# Patient Record
Sex: MALE | Race: BLACK OR AFRICAN AMERICAN | NOT HISPANIC OR LATINO | URBAN - METROPOLITAN AREA
[De-identification: names, ages, dates, MRNs, and addresses within clinical notes are randomized per-mention and may not be internally consistent; named-entity substitution may affect disease eponyms.]

---

## 2018-08-02 ENCOUNTER — EMERGENCY (EMERGENCY)
Facility: HOSPITAL | Age: 23
LOS: 1 days | Discharge: HOME | End: 2018-08-02
Attending: EMERGENCY MEDICINE
Payer: SELF-PAY

## 2018-08-02 VITALS
HEART RATE: 92 BPM | WEIGHT: 309.97 LBS | TEMPERATURE: 98 F | HEIGHT: 67 IN | OXYGEN SATURATION: 97 % | RESPIRATION RATE: 16 BRPM | SYSTOLIC BLOOD PRESSURE: 119 MMHG | DIASTOLIC BLOOD PRESSURE: 62 MMHG

## 2018-08-02 DIAGNOSIS — Z98.890 OTHER SPECIFIED POSTPROCEDURAL STATES: Chronic | ICD-10-CM

## 2018-08-02 PROCEDURE — 99284 EMERGENCY DEPT VISIT MOD MDM: CPT

## 2018-08-02 PROCEDURE — 99283 EMERGENCY DEPT VISIT LOW MDM: CPT | Mod: 25

## 2018-08-02 PROCEDURE — 73562 X-RAY EXAM OF KNEE 3: CPT | Mod: 26,LT

## 2018-08-02 PROCEDURE — 73562 X-RAY EXAM OF KNEE 3: CPT

## 2018-08-02 NOTE — ED ADULT TRIAGE NOTE - CHIEF COMPLAINT QUOTE
Pt reports going white water rafting 6 days ago and felt something "rip and pop" in his Left knee, states he has had Left knee pain ever since, also pain on weight bearing, swelling noted to the Left knee at triage

## 2018-08-02 NOTE — ED PROVIDER NOTE - MEDICAL DECISION MAKING DETAILS
24 y/o M with L knee pain s/p twisting injury 6 days ago, able to weight bear but with pain, refused pain meds in ED, will get xray, re-assess, f/u ortho.

## 2018-08-02 NOTE — ED PROVIDER NOTE - PROGRESS NOTE DETAILS
Pt examined by ED attending, Dr. Stiles who agreed with disposition and plan. Reevaluated patient at bedside.  Patient feeling much improved.  Discussed the results of all diagnostic testing in ED and copies of all reports given.   An opportunity to ask questions was given.  Discussed the importance of prompt, close medical follow-up.  Patient will return with any changes, concerns or persistent / worsening symptoms.  Understanding of all instructions verbalized. L knee placed in ace wrap, crutches, refused rx for pain meds, nsaids, will d/c home and f.u ortho.

## 2018-08-02 NOTE — ED ADULT NURSE NOTE - NSIMPLEMENTINTERV_GEN_ALL_ED
Implemented All Universal Safety Interventions:  [place] to call system. Call bell, personal items and telephone within reach. Instruct patient to call for assistance. Room bathroom lighting operational. Non-slip footwear when patient is off stretcher. Physically safe environment: no spills, clutter or unnecessary equipment. Stretcher in lowest position, wheels locked, appropriate side rails in place.

## 2018-08-02 NOTE — ED PROVIDER NOTE - LOWER EXTREMITY EXAM, LEFT
+ttp L superior and medial L knee with mild LROM on flexion secondary to pain, mild inflammation noted to knee, skin intact, no erythema/increased warmth noted, toes warm& mobile, pulses and sensation intact, NVI/SWELLING/TENDERNESS

## 2018-08-02 NOTE — ED PROVIDER NOTE - ATTENDING CONTRIBUTION TO CARE
Pt. with mild Tenderness to Left knee. FROM of knee. Pt. in no distress. I have discussed the plan with the ACP.

## 2018-08-02 NOTE — ED PROVIDER NOTE - OBJECTIVE STATEMENT
24 y/o M presents with c/o L knee pain x 5 days. Pt states that he was went white water rafting 6 days ago and accidentally twisted his L knee inward. States that he started having pain in his L knee the next day, worse when walking and occasionally feels a "pop" in his knee. Denies taking any pain meds. Pt has hx of L knee surgery 7 years ago for congenital d/o and L proximal femur surgery about 10 years ago. Denies trauma/fall, numbness, tingling, redness or other symptoms/injuries.

## 2020-03-15 NOTE — ED PROVIDER NOTE - MUSCULOSKELETAL NECK EXAM
03/15/20 1135   Final Note   Assessment Type Final Discharge Note   Anticipated Discharge Disposition Home-Health   Post-Acute Status   Post-Acute Authorization Home Health/Hospice   Home Health/Hospice Status Set-up Complete     Pt to discharge home with HH this date, and preference is [facility].  Referral sent via fax, and SW notified on-call nurse Patria via phone.  No further needs addressed at this time.   no deformity, pain or tenderness. no restriction of movement